# Patient Record
Sex: MALE | Race: AMERICAN INDIAN OR ALASKA NATIVE
[De-identification: names, ages, dates, MRNs, and addresses within clinical notes are randomized per-mention and may not be internally consistent; named-entity substitution may affect disease eponyms.]

---

## 2020-03-07 NOTE — EMERGENCY DEPARTMENT REPORT
ED Extremity Problem HPI





- General


Chief complaint: Extremity Injury, Lower


Stated complaint: RT ANKLE POSS FRACTURED/PAIN


Time Seen by Provider: 20 14:19


Source: patient


Mode of arrival: Ambulatory


Limitations: No Limitations





- History of Present Illness


Initial comments: 





Patient is a 35-year-old F American male who is presenting with right ankle 

pain.  Patient states he was running helping some people move some things and he

twisted the ankle.  It inverted.  He has pain to the lateral malleoli are area. 

Patient states pain is worse with bearing weight and movement better with rest. 

He is kept it elevated overnight.  Patient is still unable to bear weight.


Severity scale (0 -10): 5


Quality: aching


Consistency: constant





- Related Data


                                  Previous Rx's











 Medication  Instructions  Recorded  Last Taken  Type


 


Ibuprofen [Motrin 800 MG tab] 800 mg PO Q8HR PRN #14 tablet 20 Unknown Rx











                                    Allergies











Allergy/AdvReac Type Severity Reaction Status Date / Time


 


amoxicillin Allergy  Unknown Verified 20 12:52


 


Penicillins Allergy  Unknown Verified 20 12:52














ED Review of Systems


ROS: 


Stated complaint: RT ANKLE POSS FRACTURED/PAIN


Other details as noted in HPI





Comment: All other systems reviewed and negative





ED Past Medical Hx





- Past Medical History


Previous Medical History?: No





- Surgical History


Past Surgical History?: No





- Medications


Home Medications: 


                                Home Medications











 Medication  Instructions  Recorded  Confirmed  Last Taken  Type


 


Ibuprofen [Motrin 800 MG tab] 800 mg PO Q8HR PRN #14 tablet 20  Unknown Rx














ED Physical Exam





- General


Limitations: No Limitations


General appearance: alert, in no apparent distress





- Head


Head exam: Present: atraumatic, normocephalic





- Eye


Eye exam: Present: normal appearance





- ENT


ENT exam: Present: mucous membranes moist





- Neck


Neck exam: Present: normal inspection





- Respiratory


Respiratory exam: Absent: respiratory distress





- Cardiovascular


Cardiovascular Exam: Present: regular rate, normal rhythm





- GI/Abdominal


GI/Abdominal exam: Present: soft, normal bowel sounds





- Rectal


Rectal exam: Present: deferred





- Extremities Exam


Extremities exam: Present: normal inspection





- Expanded Lower Extremity Exam


  ** Right


Knee exam: Present: normal inspection, full ROM.  Absent: tenderness


Ankle exam: Present: tenderness (lateral malleolus), swelling.  Absent: full ROM





- Back Exam


Back exam: Present: normal inspection





- Neurological Exam


Neurological exam: Present: alert, oriented X3





- Psychiatric


Psychiatric exam: Present: normal affect, normal mood





- Skin


Skin exam: Present: warm, dry, intact, normal color.  Absent: rash





ED Course





                                   Vital Signs











  20





  12:59


 


Temperature 98.2 F


 


Pulse Rate 93 H


 


Respiratory 16





Rate 


 


Blood Pressure 101/64





[Right] 


 


O2 Sat by Pulse 100





Oximetry 














ED Medical Decision Making





- Radiology Data








 Patient: ZELALEM SHEA MR#: [id number] 


 [id number] 


 : 1985 Acct:[id number] 





 Age/Sex: 35 / M ADM Date: 20 





 Loc: ED 


 Attending Dr: 








 Ordering Physician: NAVNEET CARMONA MD 


 Date of Service: 20 


 Procedure(s): XR ankle 3+V RT 


 Accession Number(s): [id number] 





 cc: NAVNEET CARMONA MD 





 Fluoro Time In Minutes: 





 RIGHT ANKLE 3 VIEWS 





INDICATION / CLINICAL INFORMATION: 


Injury last night with right ankle pain and swelling. Limited range of motion. 





COMPARISON: 


None available. 





FINDINGS: 





BONES / JOINT(S): No acute fracture or subluxation. There is minimal spurring at

 the insertion of 


 the Achilles tendon on the calcaneus. 


SOFT TISSUES: There is mild soft tissue swelling overlying the lateral 

malleolus. 





ADDITIONAL FINDINGS: None. 











Signer Name: Roger Goins MD 


Signed: 3/7/2020 2:37 PM 


Workstation Name: [id number] 





- Medical Decision Making





Patient was placed in a posterior leg splint.  He was given crutches and 

medication for pain relief.  Patient appears to have a high ankle sprain.  Given

 follow-up with orthopedics.


Critical care attestation.: 


If time is entered above; I have spent that time in minutes in the direct care 

of this critically ill patient, excluding procedure time.








ED Disposition


Clinical Impression: 


High ankle sprain


Qualifiers:


 Encounter type: initial encounter Laterality: right Qualified Code(s): S93.431A

 - Sprain of tibiofibular ligament of right ankle, initial encounter





Disposition: - TO HOME OR SELFCARE


Is pt being admited?: No


Does the pt Need Aspirin: No


Condition: Stable


Instructions:  Ankle Sprain (ED), Crutch Instructions (ED)


Referrals: 


ROGER MALIK MD [Staff Physician] - 3-5 Days


Time of Disposition: 14:55

## 2020-03-07 NOTE — XRAY REPORT
RIGHT ANKLE 3 VIEWS



INDICATION / CLINICAL INFORMATION:

Injury last night with right ankle pain and swelling. Limited range of motion.



COMPARISON:

None available.

 

FINDINGS:



BONES / JOINT(S): No acute fracture or subluxation. There is minimal spurring at the insertion of the
 Achilles tendon on the calcaneus.

SOFT TISSUES: There is mild soft tissue swelling overlying the lateral malleolus.



ADDITIONAL FINDINGS: None.







Signer Name: Roger Goins MD 

Signed: 3/7/2020 2:37 PM

Workstation Name: [id number]

## 2022-05-09 ENCOUNTER — HOSPITAL ENCOUNTER (EMERGENCY)
Dept: HOSPITAL 5 - ED | Age: 37
LOS: 1 days | Discharge: LEFT BEFORE DISCHARGE | End: 2022-05-10
Payer: COMMERCIAL

## 2022-05-09 VITALS — DIASTOLIC BLOOD PRESSURE: 84 MMHG | SYSTOLIC BLOOD PRESSURE: 138 MMHG

## 2022-05-09 DIAGNOSIS — Z53.21: ICD-10-CM

## 2022-05-09 DIAGNOSIS — Z01.00: Primary | ICD-10-CM
